# Patient Record
Sex: MALE | Race: WHITE
[De-identification: names, ages, dates, MRNs, and addresses within clinical notes are randomized per-mention and may not be internally consistent; named-entity substitution may affect disease eponyms.]

---

## 2018-06-23 ENCOUNTER — HOSPITAL ENCOUNTER (EMERGENCY)
Dept: HOSPITAL 47 - EC | Age: 73
Discharge: HOME | End: 2018-06-23
Payer: MEDICARE

## 2018-06-23 VITALS
SYSTOLIC BLOOD PRESSURE: 134 MMHG | TEMPERATURE: 98.3 F | RESPIRATION RATE: 20 BRPM | HEART RATE: 86 BPM | DIASTOLIC BLOOD PRESSURE: 56 MMHG

## 2018-06-23 DIAGNOSIS — N20.0: Primary | ICD-10-CM

## 2018-06-23 DIAGNOSIS — Z79.891: ICD-10-CM

## 2018-06-23 DIAGNOSIS — J18.9: ICD-10-CM

## 2018-06-23 DIAGNOSIS — Z79.1: ICD-10-CM

## 2018-06-23 DIAGNOSIS — N36.8: ICD-10-CM

## 2018-06-23 DIAGNOSIS — F17.200: ICD-10-CM

## 2018-06-23 DIAGNOSIS — Z79.899: ICD-10-CM

## 2018-06-23 DIAGNOSIS — N40.0: ICD-10-CM

## 2018-06-23 LAB
ALBUMIN SERPL-MCNC: 3.7 G/DL (ref 3.5–5)
ALP SERPL-CCNC: 91 U/L (ref 38–126)
ALT SERPL-CCNC: 64 U/L (ref 21–72)
AMYLASE SERPL-CCNC: 81 U/L (ref 30–110)
ANION GAP SERPL CALC-SCNC: 12 MMOL/L
AST SERPL-CCNC: 45 U/L (ref 17–59)
BASOPHILS # BLD AUTO: 0 K/UL (ref 0–0.2)
BASOPHILS NFR BLD AUTO: 0 %
BUN SERPL-SCNC: 11 MG/DL (ref 9–20)
CALCIUM SPEC-MCNC: 9.4 MG/DL (ref 8.4–10.2)
CHLORIDE SERPL-SCNC: 108 MMOL/L (ref 98–107)
CK SERPL-CCNC: 87 U/L (ref 55–170)
CO2 SERPL-SCNC: 21 MMOL/L (ref 22–30)
EOSINOPHIL # BLD AUTO: 0.1 K/UL (ref 0–0.7)
EOSINOPHIL NFR BLD AUTO: 1 %
ERYTHROCYTE [DISTWIDTH] IN BLOOD BY AUTOMATED COUNT: 4.54 M/UL (ref 4.3–5.9)
ERYTHROCYTE [DISTWIDTH] IN BLOOD: 13.9 % (ref 11.5–15.5)
GLUCOSE SERPL-MCNC: 108 MG/DL (ref 74–99)
HCT VFR BLD AUTO: 43.2 % (ref 39–53)
HGB BLD-MCNC: 14.6 GM/DL (ref 13–17.5)
LIPASE SERPL-CCNC: 72 U/L (ref 23–300)
LYMPHOCYTES # SPEC AUTO: 1.5 K/UL (ref 1–4.8)
LYMPHOCYTES NFR SPEC AUTO: 14 %
MCH RBC QN AUTO: 32.1 PG (ref 25–35)
MCHC RBC AUTO-ENTMCNC: 33.7 G/DL (ref 31–37)
MCV RBC AUTO: 95.2 FL (ref 80–100)
MONOCYTES # BLD AUTO: 0.4 K/UL (ref 0–1)
MONOCYTES NFR BLD AUTO: 4 %
NEUTROPHILS # BLD AUTO: 8.7 K/UL (ref 1.3–7.7)
NEUTROPHILS NFR BLD AUTO: 80 %
PH UR: 5.5 [PH] (ref 5–8)
PLATELET # BLD AUTO: 480 K/UL (ref 150–450)
POTASSIUM SERPL-SCNC: 4.6 MMOL/L (ref 3.5–5.1)
PROT SERPL-MCNC: 7 G/DL (ref 6.3–8.2)
PROT UR QL: (no result)
RBC UR QL: >182 /HPF (ref 0–5)
SODIUM SERPL-SCNC: 141 MMOL/L (ref 137–145)
SP GR UR: 1.02 (ref 1–1.03)
UROBILINOGEN UR QL STRIP: <2 MG/DL (ref ?–2)
WBC # BLD AUTO: 10.8 K/UL (ref 3.8–10.6)

## 2018-06-23 PROCEDURE — 81001 URINALYSIS AUTO W/SCOPE: CPT

## 2018-06-23 PROCEDURE — 83690 ASSAY OF LIPASE: CPT

## 2018-06-23 PROCEDURE — 84484 ASSAY OF TROPONIN QUANT: CPT

## 2018-06-23 PROCEDURE — 96360 HYDRATION IV INFUSION INIT: CPT

## 2018-06-23 PROCEDURE — 74177 CT ABD & PELVIS W/CONTRAST: CPT

## 2018-06-23 PROCEDURE — 99284 EMERGENCY DEPT VISIT MOD MDM: CPT

## 2018-06-23 PROCEDURE — 82550 ASSAY OF CK (CPK): CPT

## 2018-06-23 PROCEDURE — 87086 URINE CULTURE/COLONY COUNT: CPT

## 2018-06-23 PROCEDURE — 82150 ASSAY OF AMYLASE: CPT

## 2018-06-23 PROCEDURE — 80053 COMPREHEN METABOLIC PANEL: CPT

## 2018-06-23 PROCEDURE — 36415 COLL VENOUS BLD VENIPUNCTURE: CPT

## 2018-06-23 PROCEDURE — 85025 COMPLETE CBC W/AUTO DIFF WBC: CPT

## 2018-06-23 PROCEDURE — 93005 ELECTROCARDIOGRAM TRACING: CPT

## 2018-06-23 PROCEDURE — 96361 HYDRATE IV INFUSION ADD-ON: CPT

## 2018-06-23 NOTE — CT
EXAMINATION TYPE: CT abdomen pelvis w con

 

DATE OF EXAM: 6/23/2018

 

COMPARISON: NONE

 

HISTORY: 72-year-old male with painless Hematuria

 

TECHNIQUE: Contiguous axial scanning of the abdomen and pelvis following administration of 100 ml Iso
eve 300 IV contrast.  Delayed images through the kidneys and coronal/sagittal reconstructions perform
ed.

 

CT DLP: 816 mGycm

Automated exposure control for dose reduction was used.

 

 

FINDINGS:

Heart is normal size without pericardial effusion. Patchy consolidation and nodularity is present in 
the posterior lower lobes and is of uncertain chronicity. No pleural effusion.

 

Moderate size hiatal hernia.

 

No focal liver lesion. No biliary ductal dilatation. Portal venous system is patent.

 

5 mm dependent gallstone. No abnormal gallbladder distention. Mild thickening of the adrenal glands w
ithout discrete nodularity.

 

A number of scattered subcentimeter hypodensities within both kidneys are too small for accurate CT r
adiation, likely cysts. Symmetric uptake and excretion of contrast from both kidneys. Small bilateral
 parapelvic cysts are noted.

 

Bilateral nephrolithiasis, approximately 6 calculi on the right, largest is dependent within the arnaldo
ceal system measuring 5 mm. 6 calculi in the left, largest measuring 4 mm.

 

Of note, there is some circumferential urothelial thickening of the upper right ureter for a short, 1
.0 to 1.5 cm long segment, refer to axial image 35 and coronal image 40.

 

Moderate atherosclerotic calcifications throughout the abdominal aorta and moderate to severe within 
the iliac arteries.

 

No dilated small bowel, free fluid, or free air. No mesenteric or retroperitoneal lymphadenopathy.

 

No significant stool burden or pericolonic inflammatory change.

 

Bladder urine distended measuring up to 11.8 cm. Prostatomegaly and 4.7 cm wide. Left-sided pelvic ph
lebolith. There is artifact relating to the percutaneous pinning along the left SI joint and left hip
 replacement limiting visualization of structures in the pelvis. No abnormal fluid collection in the 
pelvis.

 

Extensive chronic fracture deformities of the pelvis and heterotopic ossification anterior proximal r
ight thigh. Degenerative changes throughout the lumbar spine.

 

 

 

 

IMPRESSION: 

 

1. BILATERAL NEPHROLITHIASIS MEASURING UP TO 5 MM. A RIGHT SIDED RENAL CALCULUS IS DEPENDENT WITHIN T
HE CALYCEAL SYSTEM AND COULD PASS INTO THE URETER IN THE NEAR FUTURE. CURRENTLY, NO URETERAL CALCULUS
 OR OBSTRUCTIVE UROPATHY.

2. FOCAL CIRCUMFERENTIAL WALL THICKENING OF THE ABOVE THE UPPER RIGHT URETER, AXIAL IMAGE 35 COULD RE
PRESENT SOME NONSPECIFIC UROTHELIAL INFLAMMATION, URINARY TRACT INFECTION, OR EARLY UROTHELIAL NEOPLA
SM. CORRELATE WITH URINE CYTOLOGY AND FOLLOW-UP AS INDICATED.

3. PROSTATOMEGALY (4.7 CM WIDE) WITH PROMINENT URINARY BLADDER DISTENTION; CORRELATE FOR BPH.

4. NODULAR AND CONFLUENT CONSOLIDATION IN THE POSTERIOR LUNG BASES IS AGE INDETERMINATE. CORRELATE FO
R ANY ACUTE INFECTIOUS RESPIRATORY SIGNS/SYMPTOMS TO EXCLUDE PNEUMONIA.

5. MODERATE SIZED HIATAL HERNIA AND EXTENSIVE CHRONIC FRACTURE DEFORMITIES OF THE PELVIS.
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
## 2023-04-18 ENCOUNTER — HOSPITAL ENCOUNTER (OUTPATIENT)
Dept: HOSPITAL 47 - LABWHC1 | Age: 78
Discharge: HOME | End: 2023-04-18
Attending: UROLOGY
Payer: MEDICARE

## 2023-04-18 DIAGNOSIS — C61: Primary | ICD-10-CM

## 2023-04-18 PROCEDURE — 84153 ASSAY OF PSA TOTAL: CPT

## 2023-04-18 PROCEDURE — 36415 COLL VENOUS BLD VENIPUNCTURE: CPT

## 2023-10-19 ENCOUNTER — HOSPITAL ENCOUNTER (OUTPATIENT)
Dept: HOSPITAL 47 - LABWHC1 | Age: 78
Discharge: HOME | End: 2023-10-19
Attending: UROLOGY
Payer: MEDICARE

## 2023-10-19 DIAGNOSIS — C61: Primary | ICD-10-CM

## 2023-10-19 PROCEDURE — 36415 COLL VENOUS BLD VENIPUNCTURE: CPT

## 2023-10-19 PROCEDURE — 84153 ASSAY OF PSA TOTAL: CPT
